# Patient Record
Sex: MALE | Race: WHITE | NOT HISPANIC OR LATINO | Employment: FULL TIME | URBAN - METROPOLITAN AREA
[De-identification: names, ages, dates, MRNs, and addresses within clinical notes are randomized per-mention and may not be internally consistent; named-entity substitution may affect disease eponyms.]

---

## 2023-11-30 ENCOUNTER — OFFICE VISIT (OUTPATIENT)
Dept: URGENT CARE | Facility: PHYSICIAN GROUP | Age: 63
End: 2023-11-30

## 2023-11-30 VITALS
WEIGHT: 315 LBS | DIASTOLIC BLOOD PRESSURE: 86 MMHG | TEMPERATURE: 98.7 F | BODY MASS INDEX: 37.19 KG/M2 | HEIGHT: 77 IN | OXYGEN SATURATION: 94 % | RESPIRATION RATE: 20 BRPM | SYSTOLIC BLOOD PRESSURE: 124 MMHG | HEART RATE: 68 BPM

## 2023-11-30 DIAGNOSIS — J00 ACUTE NASOPHARYNGITIS: ICD-10-CM

## 2023-11-30 PROCEDURE — 3079F DIAST BP 80-89 MM HG: CPT | Performed by: PHYSICIAN ASSISTANT

## 2023-11-30 PROCEDURE — 99203 OFFICE O/P NEW LOW 30 MIN: CPT | Performed by: PHYSICIAN ASSISTANT

## 2023-11-30 PROCEDURE — 3074F SYST BP LT 130 MM HG: CPT | Performed by: PHYSICIAN ASSISTANT

## 2023-11-30 RX ORDER — BENZONATATE 100 MG/1
100 CAPSULE ORAL 3 TIMES DAILY PRN
Qty: 60 CAPSULE | Refills: 0 | Status: SHIPPED | OUTPATIENT
Start: 2023-11-30 | End: 2023-12-31

## 2023-11-30 RX ORDER — METHYLPREDNISOLONE 4 MG/1
4 TABLET ORAL DAILY
Qty: 21 TABLET | Refills: 0 | Status: SHIPPED | OUTPATIENT
Start: 2023-11-30 | End: 2023-12-31

## 2023-11-30 ASSESSMENT — ENCOUNTER SYMPTOMS
SINUS PAIN: 0
EYE PAIN: 0
CHILLS: 0
NAUSEA: 0
CONSTIPATION: 0
SHORTNESS OF BREATH: 0
SORE THROAT: 1
WHEEZING: 0
DIAPHORESIS: 0
FEVER: 0
DIARRHEA: 0
ABDOMINAL PAIN: 0
EYE DISCHARGE: 0
DIZZINESS: 0
EYE REDNESS: 0
COUGH: 1
VOMITING: 0
HEADACHES: 0

## 2023-12-01 NOTE — PROGRESS NOTES
"  Subjective:     Ventura Jenkins  is a 63 y.o. male who presents for Pharyngitis (Started yesterday) and Cough (W/phlegm )       He presents today with sore throat and cough that has been ongoing the last 24 hours.  At this time he denies fever/chills/sweats, chest pain, shortness of breath, nausea/vomiting, abdominal pain, diarrhea.  Using over-the-counter medications for symptoms.       Review of Systems   Constitutional:  Negative for chills, diaphoresis, fever and malaise/fatigue.   HENT:  Positive for sore throat. Negative for congestion, ear discharge and sinus pain.    Eyes:  Negative for pain, discharge and redness.   Respiratory:  Positive for cough. Negative for shortness of breath and wheezing.    Cardiovascular:  Negative for chest pain.   Gastrointestinal:  Negative for abdominal pain, constipation, diarrhea, nausea and vomiting.   Neurological:  Negative for dizziness and headaches.      No Known Allergies  History reviewed. No pertinent past medical history.     Objective:   /86 (BP Location: Left arm, Patient Position: Sitting, BP Cuff Size: Large adult long)   Pulse 68   Temp 37.1 °C (98.7 °F) (Temporal)   Resp 20   Ht 1.956 m (6' 5\")   Wt (!) 154 kg (339 lb 6.4 oz)   SpO2 94%   BMI 40.25 kg/m²   Physical Exam  Vitals and nursing note reviewed.   Constitutional:       General: He is not in acute distress.     Appearance: Normal appearance. He is not ill-appearing, toxic-appearing or diaphoretic.   HENT:      Head: Normocephalic.      Right Ear: Tympanic membrane, ear canal and external ear normal. There is no impacted cerumen.      Left Ear: Tympanic membrane, ear canal and external ear normal. There is no impacted cerumen.      Nose: No congestion or rhinorrhea.      Mouth/Throat:      Mouth: Mucous membranes are moist.      Pharynx: No oropharyngeal exudate or posterior oropharyngeal erythema.      Comments: No tonsillar swelling, bilaterally.  No soft tissue swelling of the " sublingual mucosa, no swelling of the soft or hard palate, no unilarteral oral pharynx swelling, no uvular deviation.  Eyes:      General:         Right eye: No discharge.         Left eye: No discharge.      Conjunctiva/sclera: Conjunctivae normal.   Cardiovascular:      Rate and Rhythm: Normal rate and regular rhythm.   Pulmonary:      Effort: Pulmonary effort is normal. No respiratory distress.      Breath sounds: Normal breath sounds. No stridor. No wheezing or rhonchi.   Musculoskeletal:      Cervical back: Neck supple.   Lymphadenopathy:      Cervical: No cervical adenopathy.   Neurological:      General: No focal deficit present.      Mental Status: He is alert and oriented to person, place, and time.   Psychiatric:         Mood and Affect: Mood normal.         Behavior: Behavior normal.         Thought Content: Thought content normal.         Judgment: Judgment normal.             Diagnostic testing: None    Assessment/Plan:     Encounter Diagnoses   Name Primary?    Acute nasopharyngitis           Plan for care for today's complaint includes starting the patient on Medrol Dosepak and Tessalon Perles for his acute nasopharyngitis and acute cough.  No evidence of support antibiotic use at this time as symptoms are likely viral in nature.  Continue to monitor symptoms and return to urgent care or follow-up with primary care provider if symptoms remain ongoing.  Follow-up in the emergency department if symptoms become severe, ER precautions discussed in office today..  Prescription for Medrol Dosepak, Tessalon Perles provided.    See AVS Instructions below for written guidance provided to patient on after-visit management and care in addition to our verbal discussion during the visit.    Please note that this dictation was created using voice recognition software. I have attempted to correct all errors, but there may be sound-alike, spelling, grammar and possibly content errors that I did not discover before  finalizing the note.    Nils Willis PA-C

## 2023-12-10 ENCOUNTER — OFFICE VISIT (OUTPATIENT)
Dept: URGENT CARE | Facility: PHYSICIAN GROUP | Age: 63
End: 2023-12-10

## 2023-12-10 VITALS
WEIGHT: 315 LBS | HEIGHT: 77 IN | RESPIRATION RATE: 20 BRPM | HEART RATE: 93 BPM | DIASTOLIC BLOOD PRESSURE: 84 MMHG | BODY MASS INDEX: 37.19 KG/M2 | OXYGEN SATURATION: 95 % | SYSTOLIC BLOOD PRESSURE: 130 MMHG | TEMPERATURE: 97.1 F

## 2023-12-10 DIAGNOSIS — J03.90 EXUDATIVE TONSILLITIS: ICD-10-CM

## 2023-12-10 PROCEDURE — 99213 OFFICE O/P EST LOW 20 MIN: CPT | Performed by: FAMILY MEDICINE

## 2023-12-10 PROCEDURE — 3075F SYST BP GE 130 - 139MM HG: CPT | Performed by: FAMILY MEDICINE

## 2023-12-10 PROCEDURE — 3079F DIAST BP 80-89 MM HG: CPT | Performed by: FAMILY MEDICINE

## 2023-12-10 RX ORDER — IBUPROFEN 800 MG/1
800 TABLET ORAL EVERY 8 HOURS PRN
COMMUNITY

## 2023-12-10 RX ORDER — B-COMPLEX WITH VITAMIN C
400 TABLET ORAL
COMMUNITY

## 2023-12-10 RX ORDER — MULTIVIT WITH MINERALS/LUTEIN
TABLET ORAL
COMMUNITY

## 2023-12-10 RX ORDER — AMOXICILLIN 875 MG/1
875 TABLET, COATED ORAL 2 TIMES DAILY
Qty: 20 TABLET | Refills: 0 | Status: SHIPPED | OUTPATIENT
Start: 2023-12-10 | End: 2023-12-20

## 2023-12-10 NOTE — PROGRESS NOTES
CC:  presents with Pharyngitis       Subjective:     CC:  presents with Pharyngitis            Pharyngitis   This is a new problem. The current episode started in the past 7 days. The problem has been gradually worsening. There has been no fever. The pain is moderate. Pertinent negatives include no abdominal pain,  , coughing, diarrhea, headaches, shortness of breath or vomiting. no exposure to strep or mono. He has tried acetaminophen for the symptoms. The treatment provided mild relief.     Social History     Tobacco Use    Smoking status: Never    Smokeless tobacco: Never   Vaping Use    Vaping Use: Never used   Substance Use Topics    Alcohol use: Not Currently    Drug use: Not Currently     Current Outpatient Medications on File Prior to Visit   Medication Sig Dispense Refill    Pseudoephedrine-APAP-DM (DAYQUIL MULTI-SYMPTOM COLD/FLU PO) Take  by mouth.      Zinc 100 MG Tab Take 400 mg by mouth.      Ascorbic Acid (VITAMIN C) 1000 MG Tab Take  by mouth.      psyllium (METAMUCIL) 58.12 % Pack Take 1 Packet by mouth every day.      ibuprofen (MOTRIN) 800 MG Tab Take 800 mg by mouth every 8 hours as needed.      Pseudoephedrine-guaiFENesin (MUCINEX D PO) Take  by mouth.      methylPREDNISolone (MEDROL DOSEPAK) 4 MG Tablet Therapy Pack Take 1 Tablet by mouth every day. Follow schedule on package instructions. 21 Tablet 0    benzonatate (TESSALON) 100 MG Cap Take 1 Capsule by mouth 3 times a day as needed for Cough. 60 Capsule 0     No current facility-administered medications on file prior to visit.     Family history was reviewed and not pertinent to current problem.       Review of Systems   Constitutional: Positive for fever  HENT: Positive for sore throat   Respiratory: Negative for cough, sputum production and shortness of breath.    Cardiovascular: Negative for chest pain.   Gastrointestinal: Negative for nausea, vomiting, abdominal pain and diarrhea.   Genitourinary: Negative.    Neurological: Negative for  "dizziness and headaches.   All other systems reviewed and are negative.         Objective:     /84 (BP Location: Left arm, Patient Position: Sitting, BP Cuff Size: Adult)   Pulse 93   Temp 36.2 °C (97.1 °F) (Temporal)   Resp 20   Ht 1.956 m (6' 5\")   Wt (!) 155 kg (341 lb 11.4 oz)   SpO2 95%       Physical Exam   Constitutional:   oriented to person, place, and time.  appears well-developed and well-nourished. No distress.   HENT:   Head: Normocephalic and atraumatic.   Right Ear: External ear normal.   Left Ear: External ear normal.   Nose: Mucosal edema present. Right sinus exhibits no maxillary sinus tenderness and no frontal sinus tenderness. Left sinus exhibits no maxillary sinus tenderness and no frontal sinus tenderness.   Mouth/Throat: Oropharyngeal exudate and posterior oropharyngeal erythema present. No posterior oropharyngeal edema.   Tonsils 2+ bilaterally     Eyes: Conjunctivae and EOM are normal. Pupils are equal, round, and reactive to light. Right eye exhibits no discharge. Left eye exhibits no discharge. No scleral icterus.   Neck: Normal range of motion. Neck supple. No JVD present. No tracheal deviation present. No thyromegaly present.   Cardiovascular: Normal rate, regular rhythm, normal heart sounds and intact distal pulses.  Exam reveals no friction rub.    No murmur heard.  Pulmonary/Chest: Effort normal and breath sounds normal. No respiratory distress.   no wheezes.   no rales.    Musculoskeletal:  exhibits no edema.   Lymphadenopathy:     Positive Cervical adenopathy.   Neurological:   alert and oriented to person, place, and time.   Skin: Skin is warm and dry. No erythema.   Psychiatric:   normal mood and affect.   Nursing note and vitals reviewed.             Assessment/Plan:      1. Exudative tonsillitis     - amoxicillin (AMOXIL) 875 MG tablet; Take 1 Tablet by mouth 2 times a day for 10 days.  Dispense: 20 Tablet; Refill: 0        Differential diagnosis, natural history, " supportive care, and indications for immediate follow-up discussed. All questions answered. Patient agrees with the plan of care.     Follow-up as needed if symptoms worsen or fail to improve to PCP, Urgent care or Emergency Room.     I have personally reviewed prior external notes and test results pertinent to today's visit.  I have independently reviewed and interpreted all diagnostics ordered during this urgent care acute visit.

## 2023-12-31 ENCOUNTER — OFFICE VISIT (OUTPATIENT)
Dept: URGENT CARE | Facility: PHYSICIAN GROUP | Age: 63
End: 2023-12-31

## 2023-12-31 VITALS
HEIGHT: 77 IN | HEART RATE: 68 BPM | RESPIRATION RATE: 16 BRPM | OXYGEN SATURATION: 98 % | DIASTOLIC BLOOD PRESSURE: 84 MMHG | TEMPERATURE: 97.6 F | SYSTOLIC BLOOD PRESSURE: 122 MMHG | BODY MASS INDEX: 37.19 KG/M2 | WEIGHT: 315 LBS

## 2023-12-31 DIAGNOSIS — F41.9 ANXIETY: ICD-10-CM

## 2023-12-31 DIAGNOSIS — J02.9 PHARYNGITIS, UNSPECIFIED ETIOLOGY: ICD-10-CM

## 2023-12-31 LAB — S PYO DNA SPEC NAA+PROBE: NOT DETECTED

## 2023-12-31 PROCEDURE — 3074F SYST BP LT 130 MM HG: CPT | Performed by: NURSE PRACTITIONER

## 2023-12-31 PROCEDURE — 99213 OFFICE O/P EST LOW 20 MIN: CPT | Performed by: NURSE PRACTITIONER

## 2023-12-31 PROCEDURE — 87651 STREP A DNA AMP PROBE: CPT | Performed by: NURSE PRACTITIONER

## 2023-12-31 PROCEDURE — 3079F DIAST BP 80-89 MM HG: CPT | Performed by: NURSE PRACTITIONER

## 2023-12-31 RX ORDER — PREDNISONE 20 MG/1
40 TABLET ORAL DAILY
Qty: 10 TABLET | Refills: 0 | Status: SHIPPED | OUTPATIENT
Start: 2023-12-31 | End: 2024-01-05

## 2023-12-31 RX ORDER — HYDROXYZINE PAMOATE 50 MG/1
50 CAPSULE ORAL 4 TIMES DAILY PRN
Qty: 30 CAPSULE | Refills: 0 | Status: SHIPPED | OUTPATIENT
Start: 2023-12-31

## 2023-12-31 ASSESSMENT — ENCOUNTER SYMPTOMS
SORE THROAT: 1
COUGH: 0
SHORTNESS OF BREATH: 0
RESPIRATORY NEGATIVE: 1
FEVER: 0

## 2023-12-31 ASSESSMENT — VISUAL ACUITY: OU: 1

## 2024-01-01 NOTE — PROGRESS NOTES
Subjective:     Ventura Jenkins is a 63 y.o. male who presents for Pharyngitis (This is is 3rd visit to clinic. First time he was seen, he was placed on steroids which did not help. He returned and was given an antibiotic which helped for a bit. He states last night he had SOB, throat tightness and a hoarse voice. )       Pharyngitis   This is a new problem. The problem has been gradually worsening. Associated symptoms include congestion (Mild). Pertinent negatives include no coughing or shortness of breath.     Patient reports first developing a sore throat about 6 weeks ago. Initially seen in urgent care 11/30/2023.  Was having symptoms of cough and sore throat.  Patient was treated with Medrol Dosepak and Tessalon Perles for his symptoms.  Patient reports that the steroid did help with the symptoms. However, sore throat started to worsen and he went to urgent care 12/10/2023.Patient was treated with amoxicillin for exudative tonsillitis. Patient reports symptoms resolved after completing the antibiotics and he was well for about 2 days until yesterday when he started to feel ill with sore throat again. Took some ibuprofen as well as supplements. He reports the symptoms are causing him anxiety and difficulty sleeping.    [place] here about 18 months ago. He is still getting acclimated to the higher elevation and drier air than he is used to.    Self-pay.      Review of Systems   Constitutional:  Positive for malaise/fatigue. Negative for fever.   HENT:  Positive for congestion (Mild) and sore throat.    Respiratory: Negative.  Negative for cough and shortness of breath.    All other systems reviewed and are negative.    Refer to HPI for additional details.    During this visit, appropriate PPE was worn, and hand hygiene was performed.    PMH:  has no past medical history on file.    MEDS:   Current Outpatient Medications:     predniSONE (DELTASONE) 20 MG Tab, Take 2  "Tablets by mouth every day for 5 days., Disp: 10 Tablet, Rfl: 0    hydrOXYzine pamoate (VISTARIL) 50 MG Cap, Take 1 Capsule by mouth 4 times a day as needed for Anxiety (or insomnia)., Disp: 30 Capsule, Rfl: 0    Pseudoephedrine-APAP-DM (DAYQUIL MULTI-SYMPTOM COLD/FLU PO), Take  by mouth., Disp: , Rfl:     Zinc 100 MG Tab, Take 400 mg by mouth., Disp: , Rfl:     Ascorbic Acid (VITAMIN C) 1000 MG Tab, Take  by mouth., Disp: , Rfl:     psyllium (METAMUCIL) 58.12 % Pack, Take 1 Packet by mouth every day., Disp: , Rfl:     ibuprofen (MOTRIN) 800 MG Tab, Take 800 mg by mouth every 8 hours as needed., Disp: , Rfl:     Pseudoephedrine-guaiFENesin (MUCINEX D PO), Take  by mouth., Disp: , Rfl:     ALLERGIES: No Known Allergies  SURGHX: History reviewed. No pertinent surgical history.  SOCHX:  reports that he has never smoked. He has never used smokeless tobacco. He reports that he does not currently use alcohol. He reports that he does not currently use drugs.    FH: Per HPI as applicable/pertinent.      Objective:     /84 (BP Location: Left arm, Patient Position: Sitting, BP Cuff Size: Adult long)   Pulse 68   Temp 36.4 °C (97.6 °F) (Temporal)   Resp 16   Ht 1.956 m (6' 5\")   Wt (!) 156 kg (343 lb)   SpO2 98%   BMI 40.67 kg/m²     Physical Exam  Nursing note reviewed.   Constitutional:       General: He is not in acute distress.     Appearance: He is well-developed. He is not ill-appearing or toxic-appearing.   HENT:      Head: Normocephalic.      Nose: Congestion present.      Mouth/Throat:      Mouth: Mucous membranes are moist.      Pharynx: Uvula midline. Pharyngeal swelling and posterior oropharyngeal erythema present. No oropharyngeal exudate.      Tonsils: 1+ on the right. 1+ on the left.   Eyes:      General: Vision grossly intact.      Extraocular Movements: Extraocular movements intact.      Conjunctiva/sclera: Conjunctivae normal.   Neck:      Trachea: Phonation normal.   Cardiovascular:      Rate " and Rhythm: Normal rate and regular rhythm.      Heart sounds: Normal heart sounds.   Pulmonary:      Effort: Pulmonary effort is normal. No respiratory distress.      Breath sounds: Normal breath sounds. No stridor. No decreased breath sounds, wheezing, rhonchi or rales.   Musculoskeletal:         General: No deformity. Normal range of motion.      Cervical back: Normal range of motion.   Skin:     General: Skin is warm and dry.      Coloration: Skin is not pale.   Neurological:      Mental Status: He is alert and oriented to person, place, and time.      Motor: No weakness.   Psychiatric:         Behavior: Behavior normal. Behavior is cooperative.     POCT Cepheid Group A Strep by PCR: negative      Assessment/Plan:     1. Pharyngitis, unspecified etiology  - POCT CEPHEID GROUP A STREP - PCR  - predniSONE (DELTASONE) 20 MG Tab; Take 2 Tablets by mouth every day for 5 days.  Dispense: 10 Tablet; Refill: 0    2. Anxiety  - hydrOXYzine pamoate (VISTARIL) 50 MG Cap; Take 1 Capsule by mouth 4 times a day as needed for Anxiety (or insomnia).  Dispense: 30 Capsule; Refill: 0    Negative for strep. No signs or symptoms of acute bacterial process requiring antibiotics at this time.    Advised symptoms are likely viral in nature. Reports he is exposed to about 3000 people per day in his line of work as a . Advised recurrent symptoms could also be related to allergies.    Rx as above sent electronically for symptom relief. Stop ibuprofen. Start Claritin.    Emphasize supportive measures, rest, fluids, and warm salt water gargles.    Monitor. Return precautions advised.     Differential diagnosis, natural history, supportive care, over-the-counter symptom management per 's instructions, close monitoring, and indications for immediate follow-up discussed.     All questions answered. Patient agrees with the plan of care.